# Patient Record
(demographics unavailable — no encounter records)

---

## 2024-10-10 NOTE — ASSESSMENT
[FreeTextEntry1] : 25M w/ PMH of ADHD, obesity is coming in for obesity management.  #Obesity -Patient continues to improve on Zepbound. Given nausea is still present (although improved), will c/w Zepbound 5mg weekly for now and reassess in 4 weeks. In 4 weeks, will consider increasing dose and also get bw   RTC in 4 weeks

## 2024-10-10 NOTE — HISTORY OF PRESENT ILLNESS
[Home] : at home, [unfilled] , at the time of the visit. [Other Location: e.g. Home (Enter Location, City,State)___] : at [unfilled] [Verbal consent obtained from patient] : the patient, [unfilled] [FreeTextEntry1] : 25M w/ PMH of ADHD, obesity is coming in for obesity management.  [de-identified] : 25M w/ PMH of ADHD, obesity is coming in for obesity management.  Some nausea initially when dose was increased to 5mg weekly, a few episodes of vomiting. Symptoms now improving as the weeks have gone by. Patient denies any other symptoms or side effects. Feels happy with progress.   Today patient weighs 290lbs (weighed himself at home).

## 2024-10-10 NOTE — HISTORY OF PRESENT ILLNESS
[Home] : at home, [unfilled] , at the time of the visit. [Other Location: e.g. Home (Enter Location, City,State)___] : at [unfilled] [Verbal consent obtained from patient] : the patient, [unfilled] [FreeTextEntry1] : 25M w/ PMH of ADHD, obesity is coming in for obesity management.  [de-identified] : 25M w/ PMH of ADHD, obesity is coming in for obesity management.  Some nausea initially when dose was increased to 5mg weekly, a few episodes of vomiting. Symptoms now improving as the weeks have gone by. Patient denies any other symptoms or side effects. Feels happy with progress.   Today patient weighs 290lbs (weighed himself at home).  - - -

## 2024-11-04 NOTE — HISTORY OF PRESENT ILLNESS
[FreeTextEntry1] : 25M w/ PMH of ADHD, obesity is coming in for obesity management. [de-identified] : 25M w/ PMH of ADHD, obesity is coming in for obesity management.  Now doing well with current dose of Zepbound 5mg weekly. No side effects.

## 2024-11-04 NOTE — ASSESSMENT
[FreeTextEntry1] : 25M w/ PMH of ADHD, obesity is coming in for obesity management.  #Obesity -C/w Zepbound 5mg weekly  -CBC, CMP ordered  -If bw okay, will consider increasing Zepbound dose in 4 weeks   #HLD -Lipid profile ordered   RTC in 4 weeks

## 2024-12-06 NOTE — HISTORY OF PRESENT ILLNESS
[Parent] : parent [FreeTextEntry1] : 25M w/ PMH of ADHD, obesity is coming in for obesity management and erythrocytosis.  [de-identified] : 25M w/ PMH of ADHD, obesity is coming in for obesity management and erythrocytosis.  Intermittent abd pain for 3 years. Saw GI previously. States his pain is better after trying to avoid foods containing lactose  Missed his last dose of Zepbound a few days ago.   Needs CBC repeated. states he does not snore.

## 2024-12-06 NOTE — PHYSICAL EXAM
[No Respiratory Distress] : no respiratory distress  [No Accessory Muscle Use] : no accessory muscle use [Normal] : affect was normal and insight and judgment were intact [No Focal Deficits] : no focal deficits

## 2024-12-06 NOTE — ASSESSMENT
[FreeTextEntry1] : 25M w/ PMH of ADHD, obesity is coming in for obesity management and erythrocytosis   #Obesity -C/w Zepbound 5mg qweekly, no side effects currently -Will consider increasing in 4 weeks  #Elevated hematocrit -Repeat CBC  #Abd pain  -Saw GI previously, says his pain is much less than before -CTM, if abd is persistent/worsening, will refer back to GI  RTC in 1 month

## 2024-12-06 NOTE — HISTORY OF PRESENT ILLNESS
[Parent] : parent [FreeTextEntry1] : 25M w/ PMH of ADHD, obesity is coming in for obesity management and erythrocytosis.  [de-identified] : 25M w/ PMH of ADHD, obesity is coming in for obesity management and erythrocytosis.  Intermittent abd pain for 3 years. Saw GI previously. States his pain is better after trying to avoid foods containing lactose  Missed his last dose of Zepbound a few days ago.   Needs CBC repeated. states he does not snore.

## 2024-12-23 NOTE — ASSESSMENT
[FreeTextEntry1] : 25M w/ PMH of ADHD, obesity is coming in for obesity management and leukocytosis.  #Obesity -Patient doing well on current dose of Zepbound, no side effects -Increase Zepbound to 7.5mg weekly and reassess in 4 weeks   #Leukocytosis  -Repeat CBC w/ diff   RTC in 1 month

## 2024-12-23 NOTE — HISTORY OF PRESENT ILLNESS
[FreeTextEntry1] : 25M w/ PMH of ADHD, obesity is coming in for obesity management and leukocytosis. [de-identified] : 25M w/ PMH of ADHD, obesity is coming in for obesity management and leukocytosis.  Today feels well, no acute complaints. Has been tolerating Zepbound 5mg weekly, no side effects noted.  Abd pain that was discussed from previous visit better, patient states does not persist after dietary changes.

## 2025-02-12 NOTE — REVIEW OF SYSTEMS
[Nasal Discharge] : nasal discharge [Headache] : headache [Negative] : Heme/Lymph [FreeTextEntry4] : sinus pain [de-identified] : ADHD not on meds

## 2025-02-12 NOTE — PHYSICAL EXAM
[No Respiratory Distress] : no respiratory distress  [No Accessory Muscle Use] : no accessory muscle use [Normal] : affect was normal and insight and judgment were intact [de-identified] : some nasal congestion

## 2025-02-12 NOTE — ASSESSMENT
[FreeTextEntry1] : 1-sinusitis- ptn has classic sxs of sinus infection. will start on Augmentin 875 mg PO Bid and flonase. encouraged hot herbal teas with honey. Vitamin C 2000mg per day and vitamin D3 2000IU daily. As ptn seems to get sinus infections often, he may benefit from olive leaf extract or oregano oil.

## 2025-02-12 NOTE — HISTORY OF PRESENT ILLNESS
[FreeTextEntry8] : This visit was provided via telehealth using real-time 2-way audio visual technology.. Verbal consent given on 02/12/2025 at 10:59 by EASTON LOPEZ, ~  ~.The patient is at his home in [facility] and the provider at home in [facility].  25 yrs old male with PMHx of ADHD, Obesity, on Zepbound, presents with 4 days hx of sinus pain, dark green mucus, HA. no fever, no chills. no bodyaches, some neck pain. Ptn states he gets sinus infections pretty often. He is doing quite well on Zepbound since August 2024 and has lost 60lbs!

## 2025-02-25 NOTE — ASSESSMENT
[FreeTextEntry1] : 25M w/ PMH of ADHD, obesity is coming in for obesity management.  #Obesity -Given good progress on current dose, will continue with Zepbound 7.5mg weekly  -Reassess in 3 months   RTC in 3 months, will obtain bw at that time

## 2025-02-25 NOTE — HISTORY OF PRESENT ILLNESS
[Home] : at home, [unfilled] , at the time of the visit. [Medical Office: ([facility])___] : at the medical office located in  [Telehealth (audio & video)] : This visit was provided via telehealth using real-time 2-way audio visual technology. [Verbal consent obtained from patient] : the patient, [unfilled] [FreeTextEntry1] : 25M w/ PMH of ADHD, obesity is coming in for obesity management. [de-identified] : 25M w/ PMH of ADHD, obesity is coming in for obesity management.  Patient was evaluated by Heme for leukocytosis, currently stable.   Today's weight is 247 lbs. Overall patient is doing well, tolerating Zepbound, no N/V. Mild fatigue day after Zepbound, but otherwise no side effects.